# Patient Record
Sex: FEMALE | Race: WHITE | NOT HISPANIC OR LATINO | URBAN - METROPOLITAN AREA
[De-identification: names, ages, dates, MRNs, and addresses within clinical notes are randomized per-mention and may not be internally consistent; named-entity substitution may affect disease eponyms.]

---

## 2021-03-23 ENCOUNTER — OFFICE VISIT (OUTPATIENT)
Dept: URBAN - METROPOLITAN AREA CLINIC 113 | Facility: CLINIC | Age: 36
End: 2021-03-23
Payer: COMMERCIAL

## 2021-03-23 ENCOUNTER — WEB ENCOUNTER (OUTPATIENT)
Dept: URBAN - METROPOLITAN AREA CLINIC 113 | Facility: CLINIC | Age: 36
End: 2021-03-23

## 2021-03-23 VITALS
HEIGHT: 63 IN | WEIGHT: 293 LBS | DIASTOLIC BLOOD PRESSURE: 86 MMHG | BODY MASS INDEX: 51.91 KG/M2 | SYSTOLIC BLOOD PRESSURE: 135 MMHG | TEMPERATURE: 98.4 F | HEART RATE: 83 BPM

## 2021-03-23 DIAGNOSIS — K74.00 FIBROSIS OF LIVER: ICD-10-CM

## 2021-03-23 DIAGNOSIS — R74.8 ELEVATED LIVER ENZYMES: ICD-10-CM

## 2021-03-23 DIAGNOSIS — K76.0 NONALCOHOLIC FATTY LIVER DISEASE: ICD-10-CM

## 2021-03-23 PROCEDURE — 99244 OFF/OP CNSLTJ NEW/EST MOD 40: CPT | Performed by: NURSE PRACTITIONER

## 2021-03-23 RX ORDER — NORETHINDRONE ACETATE AND ETHINYL ESTRADIOL 1; 20 MG/1; UG/1
1 TABLET TABLET ORAL ONCE A DAY
Status: ACTIVE | COMMUNITY

## 2021-03-23 RX ORDER — LISINOPRIL 10 MG/1
1 TABLET TABLET ORAL ONCE A DAY
Status: ACTIVE | COMMUNITY

## 2021-03-23 RX ORDER — HYDROCHLOROTHIAZIDE 25 MG/1
1 TABLET IN THE MORNING TABLET ORAL ONCE A DAY
Status: ACTIVE | COMMUNITY

## 2021-03-23 NOTE — HPI-OTHER HISTORIES
She provides labs from 2/15/2021: Negative hepatitis A antibody IgM, hepatitis B surface antigen, hepatitis B core antibody IgM, hepatitis C antibody.  Negative AMA, EBEN, ASMA.  Iron 37, TIBC 341, ferritin 59.  PT/INR 12.3/0.9.  Normal ceruloplasmin, alpha 1 antitrypsin. Abdominal ultrasound with shear wave elastography on 1/20/2021 revealed an enlarged fatty liver, with increased shear wave velocity suggesting fibrosis (3.48 m/s consistent with high stiffness). Labs 1/6/2021:H/H 13.7/40.1, MCV 85.6, , WBC 6.0.  AST 60, ALT 78, ALP 71, T bili 0.7, CMP otherwise unremarkable.  Total cholesterol 197, triglycerides 120.

## 2021-03-23 NOTE — HPI-TODAY'S VISIT:
35-year-old female with a history of hypertension, sleep apnea referred by Dr. Palumbo for evaluation of elevated liver enzymes. She is asymptomatic from a GI standpoint, denying abdominal pain, nausea, vomiting, change in bowel habits, or blood per rectum.  She has intentionally lost 20 pounds since November with dietary modification.  There is no family history of GI malignancy.

## 2021-06-29 ENCOUNTER — OFFICE VISIT (OUTPATIENT)
Dept: URBAN - METROPOLITAN AREA CLINIC 113 | Facility: CLINIC | Age: 36
End: 2021-06-29
Payer: COMMERCIAL

## 2021-06-29 VITALS
HEIGHT: 63 IN | HEART RATE: 94 BPM | BODY MASS INDEX: 51.91 KG/M2 | DIASTOLIC BLOOD PRESSURE: 96 MMHG | SYSTOLIC BLOOD PRESSURE: 151 MMHG | RESPIRATION RATE: 20 BRPM | WEIGHT: 293 LBS | TEMPERATURE: 97.7 F

## 2021-06-29 DIAGNOSIS — R74.8 ELEVATED LIVER ENZYMES: ICD-10-CM

## 2021-06-29 DIAGNOSIS — K76.0 NONALCOHOLIC FATTY LIVER DISEASE: ICD-10-CM

## 2021-06-29 DIAGNOSIS — K74.00 FIBROSIS OF LIVER: ICD-10-CM

## 2021-06-29 PROCEDURE — 99213 OFFICE O/P EST LOW 20 MIN: CPT | Performed by: INTERNAL MEDICINE

## 2021-06-29 RX ORDER — HYDROCHLOROTHIAZIDE 25 MG/1
1 TABLET IN THE MORNING TABLET ORAL ONCE A DAY
Status: ACTIVE | COMMUNITY

## 2021-06-29 RX ORDER — LISINOPRIL 10 MG/1
1 TABLET TABLET ORAL ONCE A DAY
Status: ACTIVE | COMMUNITY

## 2021-06-29 RX ORDER — NORETHINDRONE ACETATE AND ETHINYL ESTRADIOL 1; 20 MG/1; UG/1
1 TABLET TABLET ORAL ONCE A DAY
Status: ACTIVE | COMMUNITY

## 2021-06-29 NOTE — HPI-TODAY'S VISIT:
35-year-old female with a history of hypertension, sleep apnea presenting for follow-up of elevated liver enzymes. She was last seen 3/23/2021 for evaluation of elevated liver enzymes suspected to be secondary to nonalcoholic fatty liver disease, with recent RUQ ultrasound with elastography demonstrating significant fibrosis.  Prior work-up was negative for viral, autoimmune, or hereditary cause of advanced liver disease.  She was recommended efforts at weight reduction with low-fat diet and exercise, with consideration for weight loss surgery. Labs 6/28/2021:AST 25, ALT 49, ALP 74, T bili 0.5. 3/26/2021:H/H 13.3/39.4, MCV 86.2, , WBC 10.1.  AST 20, ALT 23, ALP 64, T bili 0.4, CMP otherwise unremarkable. She remains asymptomatic from a GI standpoint, denying abdominal pain, nausea, or vomiting.  Her bowel habits are regular without blood per rectum.  Her efforts at weight loss have been slowed due to two recent surgeries.  Her weight has only declined half a pound since the time of her last visit.

## 2021-12-30 ENCOUNTER — OFFICE VISIT (OUTPATIENT)
Dept: URBAN - METROPOLITAN AREA CLINIC 113 | Facility: CLINIC | Age: 36
End: 2021-12-30

## 2022-03-08 ENCOUNTER — OFFICE VISIT (OUTPATIENT)
Dept: URBAN - METROPOLITAN AREA CLINIC 113 | Facility: CLINIC | Age: 37
End: 2022-03-08
Payer: COMMERCIAL

## 2022-03-08 ENCOUNTER — DASHBOARD ENCOUNTERS (OUTPATIENT)
Age: 37
End: 2022-03-08

## 2022-03-08 VITALS
DIASTOLIC BLOOD PRESSURE: 94 MMHG | HEIGHT: 63 IN | WEIGHT: 293 LBS | HEART RATE: 93 BPM | TEMPERATURE: 98.4 F | BODY MASS INDEX: 51.91 KG/M2 | SYSTOLIC BLOOD PRESSURE: 160 MMHG

## 2022-03-08 DIAGNOSIS — K76.0 NONALCOHOLIC FATTY LIVER DISEASE: ICD-10-CM

## 2022-03-08 PROBLEM — 197315008 NON-ALCOHOLIC FATTY LIVER: Status: ACTIVE | Noted: 2021-03-23

## 2022-03-08 PROCEDURE — 99213 OFFICE O/P EST LOW 20 MIN: CPT | Performed by: INTERNAL MEDICINE

## 2022-03-08 RX ORDER — LISINOPRIL 20 MG/1
1 TABLET TABLET ORAL ONCE A DAY
Status: ACTIVE | COMMUNITY

## 2022-03-08 RX ORDER — HYDROCHLOROTHIAZIDE 25 MG/1
1 TABLET IN THE MORNING TABLET ORAL ONCE A DAY
Status: ACTIVE | COMMUNITY

## 2022-03-08 RX ORDER — NORETHINDRONE ACETATE AND ETHINYL ESTRADIOL 1; 20 MG/1; UG/1
1 TABLET TABLET ORAL ONCE A DAY
Status: ACTIVE | COMMUNITY

## 2022-03-08 NOTE — HPI-OTHER HISTORIES
She provides labs from 2/15/2021: Negative hepatitis A antibody IgM, hepatitis B surface antigen, hepatitis B core antibody IgM, hepatitis C antibody.  Negative AMA, EBEN, ASMA.  Iron 37, TIBC 341, ferritin 59.  PT/INR 12.3/0.9.  Normal ceruloplasmin, alpha 1 antitrypsin. . Abdominal ultrasound with shear wave elastography on 1/20/2021 revealed an enlarged fatty liver, with increased shear wave velocity suggesting fibrosis (3.48 m/s consistent with high stiffness). . Labs 1/6/2021:H/H 13.7/40.1, MCV 85.6, , WBC 6.0.  AST 60, ALT 78, ALP 71, T bili 0.7, CMP otherwise unremarkable.  Total cholesterol 197, triglycerides 120.

## 2022-03-08 NOTE — HPI-TODAY'S VISIT:
36-year-old female with a history of hypertension, sleep apnea presenting for follow-up of elevated liver enzymes. 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 Doing fairly well from GI perspective.  No diarrhea or constipation.  No rectal bleeding or melena.  No dysphagia.  No reflux symptoms.  She states that her sleep apnea is resolved.  She will tackle her weight problems by reducing her caloric intake increasing her physical activity.  She does not want bariatric referral at this time. . She was seen 3/23/2021 for evaluation of elevated liver enzymes suspected to be secondary to nonalcoholic fatty liver disease, with recent RUQ ultrasound with elastography demonstrating significant fibrosis.  Prior work-up was negative for viral, autoimmune, or hereditary cause of advanced liver disease.  She was recommended efforts at weight reduction with low-fat diet and exercise, with consideration for weight loss surgery. . She remains asymptomatic from a GI standpoint, denying abdominal pain, nausea, or vomiting.  Her bowel habits are regular without blood per rectum.  Her efforts at weight loss have been slowed due to two recent surgeries.  Her weight has only declined half a pound since the time of her last visit.  . Labs . January 2022.  Hemoglobin 13.0, platelet count 332,000, ALT 32, AST 21, total bilirubin 0.4, TSH 2.1 6/28/2021:AST 25, ALT 49, ALP 74, T bili 0.5. 3/26/2021:H/H 13.3/39.4, MCV 86.2, , WBC 10.1.  AST 20, ALT 23, ALP 64, T bili 0.4, CMP otherwise unremarkable. .